# Patient Record
Sex: MALE | Race: WHITE
[De-identification: names, ages, dates, MRNs, and addresses within clinical notes are randomized per-mention and may not be internally consistent; named-entity substitution may affect disease eponyms.]

---

## 2022-08-30 ENCOUNTER — HOSPITAL ENCOUNTER (OUTPATIENT)
Dept: HOSPITAL 75 - RAD FS | Age: 62
End: 2022-08-30
Attending: NURSE PRACTITIONER
Payer: MEDICARE

## 2022-08-30 DIAGNOSIS — D86.9: Primary | ICD-10-CM

## 2022-08-30 DIAGNOSIS — Z99.81: ICD-10-CM

## 2022-08-30 DIAGNOSIS — K76.0: ICD-10-CM

## 2022-08-30 PROCEDURE — 71250 CT THORAX DX C-: CPT

## 2022-08-30 NOTE — DIAGNOSTIC IMAGING REPORT
CT CHEST WO



TECHNIQUE: Multiple contiguous axial images were obtained through

the chest without the use of intravenous contrast. All CT scans

use one or more of the following dose optimizing techniques:

automated exposure control, MA and/or KvP adjustment based on a

patient size and exam type, or iterative reconstruction. 



INDICATION: Sarcoidosis, hypoxia



COMPARISON: None available



FINDINGS:



Lungs and airway: No abnormality of the trachea. There is

bilateral peribronchial reticulations with traction

bronchiectasis that have an upper lobe predominance. In the right

upper lobe, there is a small region of honeycombing. No

suspicious pulmonary nodules.



Pleura: No pleural effusion or pneumothorax.



Heart and mediastinum: Thyroid is normal. No supraclavicular or

axillary lymphadenopathy. There are a few subcentimeter

mediastinal lymph nodes that are not enlarged. No hilar

lymphadenopathy is appreciated by noncontrast imaging. Heart is

normal in size without pericardial effusion. Severe coronary

artery calcification.



Upper abdomen: Diffuse hypoattenuation of the liver suggests

hepatic steatosis. There is suggestion of nodularity to the liver

surface raising the possibility of cirrhosis, as well.



Musculoskeletal: No worrisome focal osseous lesions.



IMPRESSION:

1. 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

predominance and is a pattern most compatible with fibrotic

sarcoidosis in this patient.

2. No lymphadenopathy.

3. Hepatic steatosis with potential underlying cirrhosis.



Dictated by: 



  Dictated on workstation # [id number]

## 2022-09-15 ENCOUNTER — HOSPITAL ENCOUNTER (OUTPATIENT)
Dept: HOSPITAL 75 - CARDFS | Age: 62
End: 2022-09-15
Attending: NURSE PRACTITIONER
Payer: MEDICARE

## 2022-09-15 DIAGNOSIS — F17.200: ICD-10-CM

## 2022-09-15 DIAGNOSIS — I34.8: ICD-10-CM

## 2022-09-15 DIAGNOSIS — D86.9: Primary | ICD-10-CM

## 2022-09-15 PROCEDURE — 93306 TTE W/DOPPLER COMPLETE: CPT

## 2022-10-06 ENCOUNTER — HOSPITAL ENCOUNTER (OUTPATIENT)
Dept: HOSPITAL 75 - ORTHO | Age: 62
End: 2022-10-06
Attending: ORTHOPAEDIC SURGERY
Payer: MEDICARE

## 2022-10-06 DIAGNOSIS — M16.12: Primary | ICD-10-CM

## 2022-10-10 ENCOUNTER — HOSPITAL ENCOUNTER (OUTPATIENT)
Dept: HOSPITAL 75 - RT | Age: 62
End: 2022-10-10
Attending: NURSE PRACTITIONER
Payer: MEDICARE

## 2022-10-10 ENCOUNTER — HOSPITAL ENCOUNTER (OUTPATIENT)
Dept: HOSPITAL 75 - RAD | Age: 62
End: 2022-10-10
Attending: ORTHOPAEDIC SURGERY
Payer: MEDICARE

## 2022-10-10 DIAGNOSIS — D86.9: ICD-10-CM

## 2022-10-10 DIAGNOSIS — Z99.81: ICD-10-CM

## 2022-10-10 DIAGNOSIS — M16.12: Primary | ICD-10-CM

## 2022-10-10 DIAGNOSIS — J84.10: Primary | ICD-10-CM

## 2022-10-10 DIAGNOSIS — R91.8: ICD-10-CM

## 2022-10-10 DIAGNOSIS — R93.89: ICD-10-CM

## 2022-10-10 PROCEDURE — 73502 X-RAY EXAM HIP UNI 2-3 VIEWS: CPT

## 2022-10-10 PROCEDURE — 94060 EVALUATION OF WHEEZING: CPT

## 2022-10-10 PROCEDURE — 94729 DIFFUSING CAPACITY: CPT

## 2022-10-10 PROCEDURE — 94726 PLETHYSMOGRAPHY LUNG VOLUMES: CPT

## 2022-10-10 PROCEDURE — 71046 X-RAY EXAM CHEST 2 VIEWS: CPT

## 2022-10-10 NOTE — DIAGNOSTIC IMAGING REPORT
INDICATION: 

Left hip pain and arthritis.



TECHNIQUE/COMPARISON: 

AP and frog-leg views of the left hip were obtained with

comparison made to the study of 10/04/2022 from [facility] in [facility]



FINDINGS:

Marked narrowing of the left hip joint space is noted, most

pronounced in the weightbearing portion, with subchondral

sclerosis and mild marginal spurring. No fracture is seen.



IMPRESSION: 

Advanced left hip osteoarthritis without acute osseous

abnormality.



Dictated by: 



  Dictated on workstation # [id number]

## 2022-10-10 NOTE — DIAGNOSTIC IMAGING REPORT
INDICATION: Presurgical evaluation



COMPARISON: 08/30/2022



TECHNIQUE: Two radiographs of the chest dated 10/10/2022.



FINDINGS: The cardiac silhouette is mildly enlarged. Mild central

pulmonary vascular congestion. Diffuse interstitial opacities are

noted throughout the lungs bilaterally, greatest within the right

upper and left lower lung. These opacities have slightly worsened

since the prior examination. No significant pleural effusion. No

pneumothorax. Scattered osseous degenerative changes without

acute osseous abnormality.



IMPRESSION: Significant diffuse interstitial opacities, favored

to relate to chronic interstitial lung changes and fibrosis.

However, some regions appear slightly worsened, particularly

within the right upper and lower lung, which may relate to

superimposed infiltrate versus worsening fibrosis.



Cardiac silhouette remains mildly enlarged.



Dictated by: 



  Dictated on workstation # [id number]

## 2022-10-11 ENCOUNTER — HOSPITAL ENCOUNTER (OUTPATIENT)
Dept: HOSPITAL 75 - LAB | Age: 62
End: 2022-10-11
Attending: ORTHOPAEDIC SURGERY
Payer: MEDICARE

## 2022-10-11 DIAGNOSIS — Z01.89: Primary | ICD-10-CM

## 2022-10-11 LAB
APTT BLD: 38 SEC (ref 24–35)
APTT PPP: YELLOW S
BACTERIA #/AREA URNS HPF: NEGATIVE /HPF
BASOPHILS # BLD AUTO: 0.1 10^3/UL (ref 0–0.1)
BASOPHILS NFR BLD AUTO: 1 % (ref 0–10)
BILIRUB UR QL STRIP: NEGATIVE
BUN/CREAT SERPL: 21
CALCIUM SERPL-MCNC: 9.3 MG/DL (ref 8.5–10.1)
CHLORIDE SERPL-SCNC: 106 MMOL/L (ref 98–107)
CO2 SERPL-SCNC: 20 MMOL/L (ref 21–32)
CREAT SERPL-MCNC: 0.68 MG/DL (ref 0.6–1.3)
EOSINOPHIL # BLD AUTO: 0.2 10^3/UL (ref 0–0.3)
EOSINOPHIL NFR BLD AUTO: 2 % (ref 0–10)
FIBRINOGEN PPP-MCNC: CLEAR MG/DL
GFR SERPLBLD BASED ON 1.73 SQ M-ARVRAT: 104 ML/MIN
GLUCOSE SERPL-MCNC: 100 MG/DL (ref 70–105)
GLUCOSE UR STRIP-MCNC: NEGATIVE MG/DL
HCT VFR BLD CALC: 54 % (ref 40–54)
HGB BLD-MCNC: 18.4 G/DL (ref 13.3–17.7)
INR PPP: 0.9 (ref 0.8–1.4)
KETONES UR QL STRIP: (no result)
LEUKOCYTE ESTERASE UR QL STRIP: NEGATIVE
LYMPHOCYTES # BLD AUTO: 1.2 10^3/UL (ref 1–4)
LYMPHOCYTES NFR BLD AUTO: 13 % (ref 12–44)
MANUAL DIFFERENTIAL PERFORMED BLD QL: NO
MCH RBC QN AUTO: 35 PG (ref 25–34)
MCHC RBC AUTO-ENTMCNC: 34 G/DL (ref 32–36)
MCV RBC AUTO: 102 FL (ref 80–99)
MONOCYTES # BLD AUTO: 0.8 10^3/UL (ref 0–1)
MONOCYTES NFR BLD AUTO: 9 % (ref 0–12)
NEUTROPHILS # BLD AUTO: 6.8 10^3/UL (ref 1.8–7.8)
NEUTROPHILS NFR BLD AUTO: 75 % (ref 42–75)
NITRITE UR QL STRIP: NEGATIVE
PH UR STRIP: 5.5 [PH] (ref 5–9)
PLATELET # BLD: 148 10^3/UL (ref 130–400)
PMV BLD AUTO: 10.4 FL (ref 9–12.2)
POTASSIUM SERPL-SCNC: 4.1 MMOL/L (ref 3.6–5)
PROT UR QL STRIP: (no result)
PROTHROMBIN TIME: 12.7 SEC (ref 12.2–14.7)
RBC #/AREA URNS HPF: (no result) /HPF
SODIUM SERPL-SCNC: 138 MMOL/L (ref 135–145)
SP GR UR STRIP: >=1.03 (ref 1.02–1.02)
SQUAMOUS #/AREA URNS HPF: (no result) /HPF
WBC # BLD AUTO: 9 10^3/UL (ref 4.3–11)
WBC #/AREA URNS HPF: (no result) /HPF

## 2022-10-11 PROCEDURE — 81000 URINALYSIS NONAUTO W/SCOPE: CPT

## 2022-10-11 PROCEDURE — 36415 COLL VENOUS BLD VENIPUNCTURE: CPT

## 2022-10-11 PROCEDURE — 85025 COMPLETE CBC W/AUTO DIFF WBC: CPT

## 2022-10-11 PROCEDURE — 85610 PROTHROMBIN TIME: CPT

## 2022-10-11 PROCEDURE — 80048 BASIC METABOLIC PNL TOTAL CA: CPT

## 2022-10-11 PROCEDURE — 85730 THROMBOPLASTIN TIME PARTIAL: CPT

## 2022-10-14 ENCOUNTER — HOSPITAL ENCOUNTER (OUTPATIENT)
Dept: HOSPITAL 75 - PREOP | Age: 62
End: 2022-10-14
Attending: ORTHOPAEDIC SURGERY
Payer: MEDICARE

## 2022-10-14 VITALS — BODY MASS INDEX: 30.59 KG/M2 | WEIGHT: 216.05 LBS | HEIGHT: 70.47 IN

## 2022-10-14 DIAGNOSIS — Z01.818: Primary | ICD-10-CM

## 2022-10-24 ENCOUNTER — HOSPITAL ENCOUNTER (OUTPATIENT)
Dept: HOSPITAL 75 - 4TH | Age: 62
LOS: 2 days | Discharge: HOME HEALTH | End: 2022-10-26
Attending: ORTHOPAEDIC SURGERY
Payer: MEDICARE

## 2022-10-24 VITALS — SYSTOLIC BLOOD PRESSURE: 157 MMHG | DIASTOLIC BLOOD PRESSURE: 100 MMHG

## 2022-10-24 VITALS — DIASTOLIC BLOOD PRESSURE: 69 MMHG | SYSTOLIC BLOOD PRESSURE: 155 MMHG

## 2022-10-24 VITALS — SYSTOLIC BLOOD PRESSURE: 157 MMHG | DIASTOLIC BLOOD PRESSURE: 88 MMHG

## 2022-10-24 VITALS — DIASTOLIC BLOOD PRESSURE: 82 MMHG | SYSTOLIC BLOOD PRESSURE: 131 MMHG

## 2022-10-24 VITALS — WEIGHT: 216.05 LBS | BODY MASS INDEX: 30.59 KG/M2 | HEIGHT: 70.47 IN

## 2022-10-24 VITALS — DIASTOLIC BLOOD PRESSURE: 81 MMHG | SYSTOLIC BLOOD PRESSURE: 142 MMHG

## 2022-10-24 VITALS — SYSTOLIC BLOOD PRESSURE: 131 MMHG | DIASTOLIC BLOOD PRESSURE: 76 MMHG

## 2022-10-24 VITALS — SYSTOLIC BLOOD PRESSURE: 143 MMHG | DIASTOLIC BLOOD PRESSURE: 92 MMHG

## 2022-10-24 VITALS — DIASTOLIC BLOOD PRESSURE: 97 MMHG | SYSTOLIC BLOOD PRESSURE: 148 MMHG

## 2022-10-24 VITALS — SYSTOLIC BLOOD PRESSURE: 137 MMHG | DIASTOLIC BLOOD PRESSURE: 81 MMHG

## 2022-10-24 DIAGNOSIS — E66.9: ICD-10-CM

## 2022-10-24 DIAGNOSIS — Z89.511: ICD-10-CM

## 2022-10-24 DIAGNOSIS — M16.12: Primary | ICD-10-CM

## 2022-10-24 DIAGNOSIS — F17.290: ICD-10-CM

## 2022-10-24 PROCEDURE — 94664 DEMO&/EVAL PT USE INHALER: CPT

## 2022-10-24 PROCEDURE — 97110 THERAPEUTIC EXERCISES: CPT

## 2022-10-24 PROCEDURE — 85014 HEMATOCRIT: CPT

## 2022-10-24 PROCEDURE — 36415 COLL VENOUS BLD VENIPUNCTURE: CPT

## 2022-10-24 PROCEDURE — 87081 CULTURE SCREEN ONLY: CPT

## 2022-10-24 PROCEDURE — 72170 X-RAY EXAM OF PELVIS: CPT

## 2022-10-24 PROCEDURE — 97162 PT EVAL MOD COMPLEX 30 MIN: CPT

## 2022-10-24 PROCEDURE — 97165 OT EVAL LOW COMPLEX 30 MIN: CPT

## 2022-10-24 PROCEDURE — 85018 HEMOGLOBIN: CPT

## 2022-10-24 PROCEDURE — 93005 ELECTROCARDIOGRAM TRACING: CPT

## 2022-10-24 PROCEDURE — 27130 TOTAL HIP ARTHROPLASTY: CPT

## 2022-10-24 PROCEDURE — 94760 N-INVAS EAR/PLS OXIMETRY 1: CPT

## 2022-10-24 PROCEDURE — 97530 THERAPEUTIC ACTIVITIES: CPT

## 2022-10-24 PROCEDURE — 94640 AIRWAY INHALATION TREATMENT: CPT

## 2022-10-24 RX ADMIN — ASPIRIN SCH MG: 81 TABLET ORAL at 17:28

## 2022-10-24 RX ADMIN — CELECOXIB SCH MG: 100 CAPSULE ORAL at 20:13

## 2022-10-24 RX ADMIN — SODIUM CHLORIDE SCH MLS/HR: 900 INJECTION, SOLUTION INTRAVENOUS at 20:15

## 2022-10-24 RX ADMIN — SODIUM CHLORIDE SCH MLS/HR: 900 INJECTION, SOLUTION INTRAVENOUS at 16:42

## 2022-10-24 RX ADMIN — SODIUM CHLORIDE SCH MLS/HR: 900 INJECTION, SOLUTION INTRAVENOUS at 23:02

## 2022-10-24 RX ADMIN — SODIUM CHLORIDE, SODIUM LACTATE, POTASSIUM CHLORIDE, AND CALCIUM CHLORIDE PRN MLS/HR: 600; 310; 30; 20 INJECTION, SOLUTION INTRAVENOUS at 12:51

## 2022-10-24 RX ADMIN — SODIUM CHLORIDE, SODIUM LACTATE, POTASSIUM CHLORIDE, AND CALCIUM CHLORIDE PRN MLS/HR: 600; 310; 30; 20 INJECTION, SOLUTION INTRAVENOUS at 11:03

## 2022-10-24 RX ADMIN — DOCUSATE SODIUM SCH MG: 100 CAPSULE ORAL at 22:41

## 2022-10-24 NOTE — PROGRESS NOTE-PRE OPERATIVE
Pre-Operative Progress Note


Date of Available H&P:  Oct 6, 2022


Date H&P Reviewed:  Oct 24, 2022


Time H&P Reviewed:  11:50


History & Physical:  H&P Reviewed, Patient Examed, No changes noted


Pre-Operative Diagnosis:  Left Hip Osteoarthritis











TIM GREENWOOD MD              Oct 24, 2022 14:23

## 2022-10-24 NOTE — OPERATIVE REPORT - ORTHO
Operative Report


Surgeon (s)/Assistant (s)


Surgeon


TIM GREENWOOD MD


Assistant


n/a





Pre-Operative Diagnosis


LEFT HIP OSTEOARTHRITIS





Post-Operative Diagnosis


same





Operative Report


Date of Procedure:  Oct 24, 2022


Name of Procedure Performed:  


Left Total Hip Arthroplasty


Description & Findings


After obtaining informed consent and marking the patient in the preoperative 

holding area, the patient did receive antibiotics and was taken to the operating

room.  General anesthesia was induced and patient was positioned in the lateral 

decubitus position with the left side up.  Left lower extremity was prepped and 

draped in the usual sterile fashion.  Surgical timeout was taken.  

Posterolateral approach was utilized.  Capsule and external rotators were taken 

down in one layer.  Hip was dislocated without difficulty.  Femoral neck ost

eotomy was performed and femoral head was removed.  Acetabulum was exposed.  

Labrum and soft tissue was removed from the acetabulum.  Sequential reaming was 

began beginning with a 50 mm reamer and reaming to a 56 mm.  56 mm trial was 

placed and had good fit.  Trial was removed and the acetabulum was lavaged with 

normal saline; a 56 mm cup was impacted into place and had excellent press fit. 

A trial line was put into place.





Attention was turned to the femoral side, a cookie cutter osteotome was used to 

removed bone near the greater trochanter.  Canal finder was inserted followed by

the lateralizing reamer.  Sequential broaching was began with a 0 and was 

broached to a 6.  Trial 132 degree neck and neutral 36 mm head were put into 

place.  The hip was stable in position of sleep, and stable in flexion and 

internal rotation.  This was accepted.  Hip was dislocated.  Trial components we

re removed and the canal was irrigated.  Acetabulum was exposed and trial liner 

was removed.  Polyethylene liner was impacted into place and locking mechanism 

was checked.  A size 6 Accolade II was impacted into place and set at the same 

level as the broach.  A +2.5 36 mm trial was found to improve stability.  A +2.5

36 mm ceramic head was impacted onto the estrada taper of the stem.  Hip was once 

again located and found to have stability in position of sleep as well as 

flexion and internal rotation.





Dilute betadine soak was performed.  Hip was irrigated.  Tranexamic acid was 

applied for hemostasis.  The fascial layer was closed with #2 StrataFix.  The 

subcutaneous layer was closed with 2-0 Vicryl.  Skin was closed with staples.  

Wound was dressed with xeroform, 4x4s, ABD, and tape.  Patient was placed in 

abduction pillow and transferred to his hospital bed without difficulty and was 

stable to the recovery room.


Anesthesia Type


General


Estimated Blood Loss


400 mL


Specimen(s) collected/removed


None











TIM GREENWOOD MD              Oct 24, 2022 14:22

## 2022-10-24 NOTE — PHYSICAL THERAPY PROGRESS NOTE
Therapy Progress Note


Patient not available to participate with PT at this time.  PT to evaluate 

patient in GUERITA Kennedy PT              Oct 24, 2022 15:23

## 2022-10-24 NOTE — DIAGNOSTIC IMAGING REPORT
INDICATION:  

Post operative check. 



EXAMINATION: 

Pelvis on 10/24/2022, 2 views.



FINDINGS:

There is a total hip arthroplasty on the left which appears well

aligned. Overlying skin staples are noted with subcutaneous air,

consistent with recent surgery. There is no acute osseous

abnormality. A Beauchamp catheter is incidentally noted. 



IMPRESSION:

Uncomplicated expected post operative findings.



Dictated by: 



  Dictated on workstation # [id number]

## 2022-10-25 VITALS — SYSTOLIC BLOOD PRESSURE: 127 MMHG | DIASTOLIC BLOOD PRESSURE: 76 MMHG

## 2022-10-25 VITALS — SYSTOLIC BLOOD PRESSURE: 134 MMHG | DIASTOLIC BLOOD PRESSURE: 69 MMHG

## 2022-10-25 VITALS — SYSTOLIC BLOOD PRESSURE: 120 MMHG | DIASTOLIC BLOOD PRESSURE: 59 MMHG

## 2022-10-25 VITALS — DIASTOLIC BLOOD PRESSURE: 65 MMHG | SYSTOLIC BLOOD PRESSURE: 128 MMHG

## 2022-10-25 VITALS — SYSTOLIC BLOOD PRESSURE: 132 MMHG | DIASTOLIC BLOOD PRESSURE: 72 MMHG

## 2022-10-25 VITALS — SYSTOLIC BLOOD PRESSURE: 117 MMHG | DIASTOLIC BLOOD PRESSURE: 79 MMHG

## 2022-10-25 LAB
HCT VFR BLD CALC: 49 % (ref 40–54)
HGB BLD-MCNC: 16.4 G/DL (ref 13.3–17.7)

## 2022-10-25 RX ADMIN — FLUTICASONE PROPIONATE AND SALMETEROL SCH EACH: 232; 14 POWDER, METERED RESPIRATORY (INHALATION) at 21:42

## 2022-10-25 RX ADMIN — Medication SCH EA: at 06:10

## 2022-10-25 RX ADMIN — CELECOXIB SCH MG: 100 CAPSULE ORAL at 19:10

## 2022-10-25 RX ADMIN — ROSUVASTATIN CALCIUM SCH MG: 10 TABLET, FILM COATED ORAL at 08:55

## 2022-10-25 RX ADMIN — UMECLIDINIUM SCH INH: 62.5 AEROSOL, POWDER ORAL at 07:13

## 2022-10-25 RX ADMIN — SODIUM CHLORIDE SCH MLS/HR: 900 INJECTION, SOLUTION INTRAVENOUS at 06:11

## 2022-10-25 RX ADMIN — ASPIRIN SCH MG: 81 TABLET ORAL at 18:23

## 2022-10-25 RX ADMIN — DOCUSATE SODIUM SCH MG: 100 CAPSULE ORAL at 19:14

## 2022-10-25 RX ADMIN — ASPIRIN SCH MG: 81 TABLET ORAL at 08:55

## 2022-10-25 RX ADMIN — GABAPENTIN SCH MG: 300 CAPSULE ORAL at 11:44

## 2022-10-25 RX ADMIN — HYDROMORPHONE HYDROCHLORIDE PRN MG: 2 INJECTION, SOLUTION INTRAMUSCULAR; INTRAVENOUS; SUBCUTANEOUS at 09:03

## 2022-10-25 RX ADMIN — DOCUSATE SODIUM SCH MG: 100 CAPSULE ORAL at 08:55

## 2022-10-25 RX ADMIN — CELECOXIB SCH MG: 100 CAPSULE ORAL at 08:55

## 2022-10-25 RX ADMIN — GABAPENTIN SCH MG: 300 CAPSULE ORAL at 11:52

## 2022-10-25 RX ADMIN — SODIUM CHLORIDE SCH MLS/HR: 900 INJECTION, SOLUTION INTRAVENOUS at 04:08

## 2022-10-25 RX ADMIN — FLUTICASONE PROPIONATE AND SALMETEROL SCH EACH: 232; 14 POWDER, METERED RESPIRATORY (INHALATION) at 07:13

## 2022-10-25 RX ADMIN — HYDROMORPHONE HYDROCHLORIDE PRN MG: 2 INJECTION, SOLUTION INTRAMUSCULAR; INTRAVENOUS; SUBCUTANEOUS at 19:22

## 2022-10-25 NOTE — OCCUPATIONAL THERAPY EVAL
OT Evaluation-General/PLF


Medical Diagnosis


Admission Date


Oct 24, 2022 at 10:11


Medical Diagnosis:  L CIRILO


Onset Date:  Oct 24, 2022





Therapy Diagnosis


Therapy Diagnosis:  decreased ADL status





Precautions


Precautions/Isolations:  Standard Precautions


Comments


L hip precautions





Weight Bear Status


Weight Bearing Restriction:  Weight Bearing/Tolerated





Referral


Physician:  Jeanmarie


Referral Reason:  Evaluation/Treatment





Medical History


Current History


s/p L CIRILO 10/25/22





Social History


Home:  Single Level


Current Living Status:  Spouse





ADL-Prior Level of Function


SCALE: Activities may be completed with or without assistive devices.





6-Indepedent-patient completes the activity by him/herself with no assistance 

from a helper.


5-Set-up or Clean-up Assistance-helper sets up or cleans up; patient completes 

activity. [place] assists only prior to or  


    following the activity.


4-Supervision or Touching Assistance-helper provides verbal cues and/or 

touching/steadying and/or contact guard assistance as patient completes activity

. Assistance may be provided   


    throughout the activity or intermittently.


3-Partial/Moderate Assistance-helper does LESS THAN HALF the effort. [place] 

lifts, holds or supports trunk or limbs, but provides less than half the effort.


2-Substantial/Maximal Assistance-helper does MORE THAN HALF the effort. [place] 

lifts or holds trunk or limbs and provides more than half the effort.


1-Wbyjzjpfr-gjuhuk does ALL the effort. Patient does none of the effort to 

complete the activity. Or, the assistance of 2 or more helpers is required for 

the patient to complete the  


    activity.


If activity was not attempted, code reason:


7-Patient Refused.


9-Not Applicable-not attempted and the patient did not perform the activity 

before the current illness, exacerbation or injury.


10-Not Attempted due to Environmental Limitations-(lack of equipment, weather 

restraints, etc.).


88-Not Attempted due to Medical Conditions or Safety Concerns.


ADL PLOF Comments


Pt reports IND with ADLs and functional mobility at PLOF


Self Care:  Independent


Functional Cognition:  Independent





OT Current Status


Subjective


Pt standing EOB upon OT arrival, agreeable to OT evaluation/tx,





Mental Status/Objective


Patient Orientation:  Person, Place, Time, Situation


Attachments:  Other-See Comments (RLE prosthesis)





Current


Upper Extremity ROM


WFL


Upper Extremity Strength


WFL





ADL-Treatment


Eating (QC):  6 (Per pt report)


Oral Hygiene (QC):  6 (Per pt report)


Upper Body Dressing (QC):  5 (Per clinical judgment.)


Lower Body Dressing (QC):  2 (Pt required assist with threading BLEs due to hip 

precautions.)


On/Off Footwear (QC):  1 (total assist due to hip precautions.)





Other Treatments


Pt standing EOB, agreeable to OT evaluation/tx. Pt provided information about 

PLOF and home set up and participated in UE screen. Pt able to recall hip 

precautions, OT provided education on hip precautions in regards to LE dressing.

Pt states his wife is able to assist him with LE dressing at home, they are both

retired. Pt has no concerns with his ability to complete ADLs upon returning 

home. He declines education on AE education, as he would prefer his wife to 

complete for him. Post tx, pt with PT, all needs met.





Education


OT Patient Education:  Correct positioning, Modified ADL techniques, Progress 

toward Goal/Update tx plan, Purpose of tx/functional activities, Reviewed 

precautions, Rehab process


Teaching Recipient:  Patient


Teaching Methods:  Discussion


Response to Teaching:  Verbalize Understanding





OT Long Term Goals


Long Term Goals


1=Demonstrate adherence to instructed precautions during ADL tasks.


2=Patient will verbalize/demonstrate understanding of assistive 

devices/modifications for ADL.


3=Patient will improve strength/tolerance for activity to enable patient to 

perform ADL's.





OT Education/Plan


Problem List/Assessment


Assessment:  No Skilled OT Needs ID'd


Pt able to recall hip precautions, and education provided on LE dressing with 

hip precautions. Pt declined further OT services at this time, states his wife 

can assist him at home and he is not interested in learning AE for LE dressing. 

Pt is at his PLOF with ADLs, except LE dressing due to hip precautions. Pt 

declined further OT services, d/c from OT at this time. If pt wants further 

information/education on AE, please send new OT orders.





Discharge Recommendations


Plan/Recommendations:  Discharge/Goals Met





Treatment Plan/Plan of Care


Patient would benefit from OT for education, treatment and training to promote 

independence in ADL's, mobility, safety and/or upper extremity function for 

ADL's.


Plan of Care:  ADL Retraining


Treatment Duration:  Oct 25, 2022


Frequency:  1 time per week (eval only)





Time/GCodes


Start Time:  10:25


Stop Time:  10:35


Total Time Billed (hr/min):  10


Billed Treatment Time


1, SALMA LYMAN OT          Oct 25, 2022 10:55

## 2022-10-25 NOTE — PHYSICAL THERAPY DAILY NOTE
PT Daily Note-Current


Subjective


Patient ambulating in hallway independently with FWW.





Pain





   Numeric Pain Scale:  5-Moderate Pain


   Location:  Left


   Location Body Site:  Hip


   Pain Description:  Acute


Section J - Health Conditions


1. Rarely or not at all


2. Occasionally


3. Frequently


4. Almost constantly


8. Unable to answer


Pain Effect on Sleep:  1


Pain Interference with Therapy:  1


Pain Interference w/Day-to-Day:  1





Transfers


SCALE: Activities may be completed with or without assistive devices.





6-Indepedent-patient completes the activity by him/herself with no assistance 

from a helper.


5-Set-up or Clean-up Assistance-helper sets up or cleans up; patient completes 

activity. [place] assists only prior to or  


    following the activity.


4-Supervision or Touching Assistance-helper provides verbal cues and/or 

touching/steadying and/or contact guard assistance as patient completes 

activity. Assistance may be provided   


    throughout the activity or intermittently.


3-Partial/Moderate Assistance-helper does LESS THAN HALF the effort. [place] 

lifts, holds or supports trunk or limbs, but provides less than half the effort.


2-Substantial/Maximal Assistance-helper does MORE THAN HALF the effort. [place] 

lifts or holds trunk or limbs and provides more than half the effort.


3-Fbvqcbbtc-mintni does ALL the effort. Patient does none of the effort to 

complete the activity. Or, the assistance of 2 or more helpers is required for 

the patient to complete the  


    activity.


If activity was not attempted, code reason:


7-Patient Refused.


9-Not Applicable-not attempted and the patient did not perform the activity 

before the current illness, exacerbation or injury.


10-Not Attempted due to Environmental Limitations-(lack of equipment, weather 

restraints, etc.).


88-Not Attempted due to Medical Conditions or Safety Concerns.


Sit to Stand (QC):  6





Weight Bearing


Right Lower Extremity:  Right


Full Weight Bearing


Left Lower Extremity:  Left


Weight Bearing/Tolerated





Assessment


Education with patient on importance of rest and exercise to allow proper 

healing of left hip.  Patient voices understanding.  Patient recites hip 

precautions.  Plan dismissal tomorrow after PT.





PT Short Term Goals


Short Term Goals


Time Frame:  Oct 27, 2022


Roll Left & Right:  6


Sit to lyin


Lying to sitting on side of be:  6


Sit to stand:  6


Chair/bed-to-chair transfer:  6


Toilet transfer:  6


Walk 10 feet:  6


Walk 50 feet with two turns:  6


Walk 150 feet:  6





PT Plan


Treatment/Plan


Treatment Plan:  Continue Plan of Care


Treatment Plan:  Education, Functional Activity Zhane, Functional Strength, 

Gait, Therapeutic Exercise


Treatment Duration:  Oct 27, 2022


Frequency:  BID until dismissal


Estimated Hrs Per Day:  .5 hour per day


Patient and/or Family Agrees t:  Yes





Time


Time In:  1305


Time Out:  1314


Total Billed Treatment Time:  8


Total Billed Treatment


1 visit


Educ 8 min











GUERITA WEN PT              Oct 25, 2022 14:12

## 2022-10-25 NOTE — PHYSICAL THERAPY EVALUATION
PT Evaluation-General


Medical Diagnosis


Admission Date


Oct 24, 2022 at 10:11


Medical Diagnosis:  L CIRILO


Onset Date:  Oct 24, 2022





Therapy Diagnosis


Therapy Diagnosis:  debility/weakness





Precautions


Precautions/Isolations:  Standard Precautions





Weight Bear Status


Right Lower Extremity:  Right


Full Weight Bearing


Left Lower Extremity:  Left


Weight Bearing/Tolerated





Referral


Physician:  Jeanmarie


Reason for Referral:  Evaluation/Treatment





Medical History


Additional Medical History


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


Current History


s/p elective left THR


Reviewed History:  Yes





Social History


Home:  Single Level


Current Living Status:  Spouse


Entry Into Home:  Level Entry





Prior


Prior Level of Function


SCALE: Activities may be completed with or without assistive devices.





6-Indepedent-patient completes the activity by him/herself with no assistance 

from a helper.


5-Set-up or Clean-up Assistance-helper sets up or cleans up; patient completes 

activity. [place] assists only prior to or  


    following the activity.


4-Supervision or Touching Assistance-helper provides verbal cues and/or 

touching/steadying and/or contact guard assistance as patient completes 

activity. Assistance may be provided   


    throughout the activity or intermittently.


3-Partial/Moderate Assistance-helper does LESS THAN HALF the effort. [place] 

lifts, holds or supports trunk or limbs, but provides less than half the effort.


2-Substantial/Maximal Assistance-helper does MORE THAN HALF the effort. [place] 

lifts or holds trunk or limbs and provides more than half the effort.


6-Tzamvkfld-cdyqlx does ALL the effort. Patient does none of the effort to 

complete the activity. Or, the assistance of 2 or more helpers is required for 

the patient to complete the  


    activity.


If activity was not attempted, code reason:


7-Patient Refused.


9-Not Applicable-not attempted and the patient did not perform the activity 

before the current illness, exacerbation or injury.


10-Not Attempted due to Environmental Limitations-(lack of equipment, weather 

restraints, etc.).


88-Not Attempted due to Medical Conditions or Safety Concerns.


Bed Mobility:  6


Transfers (B,C,W/C):  6


Gait:  6


Stairs:  6


Indoor Mobility (Ambulation):  Independent


Stairs:  Independent


Prior Devices Use:  Other-see list below


Prior Device Use:  cane PRN





PT Evaluation-Current


Subjective


Patient agrees to PT.





Pain





   Numeric Pain Scale:  3


   Location:  Left


   Location Body Site:  Hip


   Pain Description:  Acute





Objective


Patient Orientation:  Normal For Age





ROM/Strength


ROM Lower Extremities


left hip precautions/right LE WFL


Strength Lower Extremities


left LE 4-/5;right LE 4+/5 grossly





Integumentary/Posture


Bowel Incontinence:  No


Bladder Incontinence:  No


Posture


WFL





Neuromuscular


(Tone, Coordination, Reflexes)


grossly intact





Sensory


Vision:  Functional





Transfers


Sit to Lying (QC):  6


Lying to Sitting/Side of Bed(Q:  6


Sit to Stand (QC):  6


Chair/Bed-to-Chair Xfer(QC):  6


Toilet Transfer (QC):  6





Gait


Mode of Locomotion:  Walk


Anticipated Mode of Locomotion:  Walk


Walk 10 feet (QC):  6


Walk 50 ft with 2 Turns(QC):  6


Walk 150 ft (QC):  6


Distance:  >500'


Gait Assistive Device:  FWW


Comments/Gait Description


reciprocal pattern





Balance


Sitting Static:  Normal


Sitting Dynamic:  Normal


Standing Static:  Normal


 Standing Dynamic:  Normal





Assessment/Needs


Patient educated on hip precautions earlier on this date with ability to retain 

and repeat.  Patient is currently at independent LOF with all gross motor 

skills.  Patient will utilize spouse to perform dressing lower body due to hip 

precautions.  Patient instructed to ambulate PRN in hallway and to be up in room

ad brian.  RN notified.


Rehab Potential:  Good





PT Short Term Goals


Short Term Goals


Time Frame:  Oct 27, 2022


Roll Left & Right:  6


Sit to lyin


Lying to sitting on side of be:  6


Sit to stand:  6


Chair/bed-to-chair transfer:  6


Toilet transfer:  6


Walk 10 feet:  6


Walk 50 feet with two turns:  6


Walk 150 feet:  6





PT Plan


Treatment/Plan


Treatment Plan:  Continue Plan of Care


Treatment Plan:  Education, Functional Activity Zhane, Functional Strength, 

Gait, Therapeutic Exercise


Treatment Duration:  Oct 27, 2022


Frequency:  BID until dismissal


Estimated Hrs Per Day:  .5 hour per day


Patient and/or Family Agrees t:  Yes





Time


Time In:  1035


Time Out:  1102


Total Billed Treatment Time:  27


Total Billed Treatment


1 visit


EVModC 15 min


FA 12 min











GUERITA WEN PT              Oct 25, 2022 11:14

## 2022-10-25 NOTE — PROGRESS NOTE - ORTHO
Progress Note


Subjective


Date of Exam


10/25/22


Chief Complaint


POD #1 L CIRILO


HPI/Events since last exam


doing well, no specific concerns


Review of Systems


-


Allergies:  


Coded Allergies:  


     codeine (Unverified  Adverse Reaction, Unknown, Vomiting, 10/10/22)


     morphine (Unverified  Adverse Reaction, Unknown, Vomiting, 10/10/22)


Home Meds


Reported Medications


Tramadol HCl (Tramadol HCl ER) 100 Mg Tab.er.24h, 200 MG PO DAILY, TAB


   10/14/22


Rosuvastatin Calcium (Rosuvastatin Calcium) 10 Mg Tablet, 10 MG PO DAILY, TAB


   10/14/22


Naproxen (Naprosyn) 500 Mg Tablet, 500 MG PO UD, TAB


   10/14/22


Meloxicam (Meloxicam) 15 Mg Tablet, 15 MG PO UD, TAB


   10/14/22


Umeclidinium Bromide (Incruse Ellipta) 62.5 Mcg/Actuation Blst.w.dev, 62.5 MCG 

IH UD


   10/14/22


Gabapentin (Neurontin) 300 Mg Capsule, 300 MG PO UD, CAP


   10/14/22


Fluticasone/Vilanterol (Breo Ellipta 200-25 Mcg INH) 200 Mcg-25 Mcg/Dose 

Blst.w.dev, 1 EACH IH UD


   10/14/22





Objective


Exam


L Leg:  Dressing C/D/I, +DF of ankle, no s/s of DVT


Vital Signs





Vital Signs








  Date Time  Temp Pulse Resp B/P (MAP) Pulse Ox O2 Delivery O2 Flow Rate FiO2


 


10/25/22 08:13      Room Air  


 


10/25/22 08:13      Room Air  


 


10/25/22 04:10 36.8 79 18 117/79 (92) 98 Nasal Cannula 5.00 


 


10/25/22 00:07 36.0 87 20 127/76 (93) 97 Nasal Cannula 5.00 


 


10/24/22 21:00      Nasal Cannula 3.00 


 


10/24/22 20:24 36.4 84 20 131/76 (94) 94 Nasal Cannula 4.00 


 


10/24/22 16:43 35.9 97 20 143/92 (109) 90 Nasal Cannula 4.00 


 


10/24/22 15:55     94 Nasal Cannula 3.00 


 


10/24/22 15:20      Nasal Cannula 3.00 


 


10/24/22 15:10 36.4  20 157/88 (111) 94 Nasal Cannula 3.00 


 


10/24/22 15:05      OxyMask 3.00 


 


10/24/22 15:00   20 155/69 (97) 92 OxyMask 3.00 


 


10/24/22 14:50   20 142/81 (101) 92 OxyMask 3.00 


 


10/24/22 14:50      OxyMask 3.00 


 


10/24/22 14:40   20 148/97 (114) 94 OxyMask 4.00 


 


10/24/22 14:35      OxyMask 6.00 


 


10/24/22 14:30   18 157/100 (119) 95 OxyMask 6.00 


 


10/24/22 14:20 36.6  16 131/82 (98) 99 OxyMask 8.00 


 


10/24/22 14:20      OxyMask 8.00 


 


10/24/22 11:07      Room Air  


 


10/24/22 10:35 36.8 91 18 137/81 (99) 95 Room Air  














I & O 


 


 10/25/22





 07:00


 


Intake Total 4650 ml


 


Output Total 1450 ml


 


Balance 3200 ml








Lab Results


Laboratory Tests


10/25/22 05:54: 


Hemoglobin 16.4, Hematocrit 49


Imaging


Postop AP pelvis was reviewed and demonstrated left total hip arthroplasty with 

components in good position and no complication





Assessment and Plan


Assessment


Left Hip Primary OA s/p L CIRILO


Problem List


Left Hip Primary OA s/p L CIRILO


Plan


PT/OT


DVT Prophylaxis


Home with home health tomorrow





Final Diagonsis


Left Hip Primary OA s/p L CIRILO


Level of the visit:  Level 3 (global)











TIM GREENWOOD MD              Oct 25, 2022 08:32

## 2022-10-25 NOTE — ANESTHESIA-GENERAL POST-OP
General


Patient Condition


Mental Status/LOC:  Same as Preop


Cardiovascular:  Satisfactory


Nausea/Vomiting:  Absent


Respiratory:  Satisfactory


Pain:  Controlled


Complications:  Absent





Post Op Complications


Complications


None





Follow Up Care/Instructions


Patient Instructions


None needed.





Anesthesia/Patient Condition


Patient Condition


Patient is doing well, no complaints, stable vital signs, no apparent adverse 

anesthesia problems.   


No complications reported per nursing.











CHRISTIANO ROPER CRNA            Oct 25, 2022 09:59

## 2022-10-26 VITALS — SYSTOLIC BLOOD PRESSURE: 139 MMHG | DIASTOLIC BLOOD PRESSURE: 87 MMHG

## 2022-10-26 VITALS — SYSTOLIC BLOOD PRESSURE: 124 MMHG | DIASTOLIC BLOOD PRESSURE: 79 MMHG

## 2022-10-26 VITALS — SYSTOLIC BLOOD PRESSURE: 128 MMHG | DIASTOLIC BLOOD PRESSURE: 75 MMHG

## 2022-10-26 LAB
HCT VFR BLD CALC: 50 % (ref 40–54)
HGB BLD-MCNC: 16.9 G/DL (ref 13.3–17.7)

## 2022-10-26 RX ADMIN — DOCUSATE SODIUM SCH MG: 100 CAPSULE ORAL at 08:22

## 2022-10-26 RX ADMIN — ASPIRIN SCH MG: 81 TABLET ORAL at 08:18

## 2022-10-26 RX ADMIN — FLUTICASONE PROPIONATE AND SALMETEROL SCH EACH: 232; 14 POWDER, METERED RESPIRATORY (INHALATION) at 10:23

## 2022-10-26 RX ADMIN — GABAPENTIN SCH MG: 300 CAPSULE ORAL at 08:18

## 2022-10-26 RX ADMIN — DOCUSATE SODIUM SCH MG: 100 CAPSULE ORAL at 08:18

## 2022-10-26 RX ADMIN — Medication SCH EA: at 05:36

## 2022-10-26 RX ADMIN — UMECLIDINIUM SCH INH: 62.5 AEROSOL, POWDER ORAL at 10:23

## 2022-10-26 RX ADMIN — CELECOXIB SCH MG: 100 CAPSULE ORAL at 08:18

## 2022-10-26 RX ADMIN — ROSUVASTATIN CALCIUM SCH MG: 10 TABLET, FILM COATED ORAL at 08:18

## 2022-10-26 NOTE — DISCHARGE SUMMARY
Discharge Summary


Hospital Course


Hospital Course


Date of Admission: Oct 24, 2022 at 10:11 


Admission Diagnosis : Left Hip Primary Osteoarthritis 





Family Physician/Provider: Estela Turk  





Date of Discharge: 10/26/22 


Discharge Diagnosis: [ Left Hip Primary Osteoarthritis]








Hospital Course:


[On 10/24/22, patient underwent left total hip arthroplasty.  Postoperatively, 

admitted to the regular floor.  Mechanical DVT prophylaxis was began.  Began to 

mobilize that afternoon.  On POD #1, had some difficulty with pain control.  

Made progress with therapy.  Arrangements were made for home health.  On POD #2,

 pain control was improved and was ambulating over 100'.  Tolerating a regular 

diet.  Ready for discharge home with home health. ]














Labs and Pending Lab Test:


Laboratory Tests


10/26/22 05:45: 


Hemoglobin 16.9, Hematocrit 50





Microbiology


10/24/22 MRSA Screen - Final, Complete


           MRSA not isolated





Home Meds


Active


Aspirin EC (Aspirin) 81 Mg Tablet.dr 81 Mg PO BID WITH MEALS 35 Days


Reported


Aspirin EC (Aspirin) 81 Mg Tablet.dr 81 Mg PO DAILY


Ibuprofen 200 Mg Capsule 400 Mg PO DAILY PRN


Tramadol HCl 50 Mg Tablet 100 Mg PO DAILY


     TAKES 2 (50NG) TABS


Metaxalone 800 Mg Tablet 800 Mg PO DAILY PRN


Rosuvastatin Calcium 10 Mg Tablet 10 Mg PO DAILY


Meloxicam 15 Mg Tablet 15 Mg PO DAILY


Incruse Ellipta (Umeclidinium Bromide) 62.5 Mcg/Actuation Blst.w.dev 1 Puff IH 

DAILY


Neurontin (Gabapentin) 300 Mg Capsule 600 Mg PO DAILY


     TAKES 2 (300MG) CAPS


Breo Ellipta 200-25 Mcg INH (Fluticasone/Vilanterol) 200 Mcg-25 Mcg/Dose 

Blst.w.dev 1 Puff IH DAILY


Assessment/Pt Instructions


WBAT on left leg.  Walker for assist.  Posterior hip precautions.  Dry dressing 

daily; keep surgical site dry.





Discharge Physical Examination


Vital Signs





Vital Signs








  Date Time  Temp Pulse Resp B/P (MAP) Pulse Ox O2 Delivery O2 Flow Rate FiO2


 


10/26/22 10:26      Room Air  


 


10/26/22 10:24     90   


 


10/26/22 08:00 36.6 92 18 139/87 (104)    


 


10/25/22 09:00       4.00 








Extremity:  Other (Left Hip:  Dressing C/D/I, +DF of ankle, no s/s of DVT)


Allergies:  


Coded Allergies:  


     codeine (Unverified  Adverse Reaction, Unknown, Vomiting, 10/10/22)


     morphine (Unverified  Adverse Reaction, Unknown, Vomiting, 10/10/22)





Discharge Summary


Date of Admission


Oct 24, 2022 at 10:11


Date of Discharge














TIM GREENWOOD MD              Oct 26, 2022 10:32

## 2022-10-26 NOTE — D/C HH FACE TO FACE ORDER
D/C  Face to Face Orders


Instructions for Patient


[facility], [phone]


Patient Instructions/FollowUp:  


WBAT on left leg.  Walker for assist.  Posterior hip precautions.  Dry


dressing daily; keep surgical site dry.  F/U in 2 weeks with Dr. Murry


Physician to follow Patient:  Tavo Murry


Discharge Diet for Home:  No Restrictions





Patient Data-Allergies,Ht & Wt


Patient Allergies:  


Coded Allergies:  


     codeine (Unverified  Adverse Reaction, Unknown, Vomiting, 10/10/22)


     morphine (Unverified  Adverse Reaction, Unknown, Vomiting, 10/10/22)





Home Health Need/Face to Face


Date of Face to Face:  Oct 26, 2022


Clinical Findings:  Muscle weakness, Pain with ambulation, Unsteady gait


I have seen Pt face-to-face:  Yes


Discharged To:  Home


Diagnosis/Conditions:  


Left Hip Primary OA s/p CIRILO


Patient is Homebound due to:  Muscle weakness, Pain w/ambulation


Homebound Status


   Due to the above stated illness, injury or surgical procedure (medical 

condition or diagnosis) and associated clinical findings, the patient is 

homebound because of his/her inability to leave home except with aid of a 

supportive device and/or person AND leaving the home requires a considerable and

taxing effort or is medically contraindicated.


Pt req the following assistanc:  Walker





Home Health Nursing Orders


Home Health Services Order:  Physical Therapy-Evaluate & Treat





Home Health Infusion Therapy


Line Start Date:  Oct 24, 2022





Therapy Orders


Therapy Specific Orders:  Gait training, Increase strength/endurance, Restore 

ROM


Certify Stmt


I certify that this patient is under my care and that I, a nurse practitioner or

a physician; a assistant working with me, had a face to face encounter that -

meets the physician face to face encounter requirements with this patient as 

dated.











TAVO MURRY MD              Oct 26, 2022 10:33

## 2022-10-26 NOTE — PHYSICAL THERAPY DAILY NOTE
PT Daily Note-Current


Subjective


Pt. has min c/o pain at 4/10 left hip and several questions but feels confident 

about going home. Pt. hopes to receive home care PT then out pt PT to transition

to cane or no device





Pain





   Numeric Pain Scale:  4


   Location:  Left


   Location Body Site:  Hip


   Pain Description:  Ache


Section J - Health Conditions


1. Rarely or not at all


2. Occasionally


3. Frequently


4. Almost constantly


8. Unable to answer


Pain Effect on Sleep:  1


Pain Interference with Therapy:  1


Pain Interference w/Day-to-Day:  1





Mental Status


Patient Orientation:  Normal For Age


Attachments:  Other-See Comments (prosthesis right BKA)





Transfers


SCALE: Activities may be completed with or without assistive devices.





6-Indepedent-patient completes the activity by him/herself with no assistance 

from a helper.


5-Set-up or Clean-up Assistance-helper sets up or cleans up; patient completes 

activity. [place] assists only prior to or  


    following the activity.


4-Supervision or Touching Assistance-helper provides verbal cues and/or 

touching/steadying and/or contact guard assistance as patient completes ac

tivity. Assistance may be provided   


    throughout the activity or intermittently.


3-Partial/Moderate Assistance-helper does LESS THAN HALF the effort. [place] 

lifts, holds or supports trunk or limbs, but provides less than half the effort.


2-Substantial/Maximal Assistance-helper does MORE THAN HALF the effort. [place] 

lifts or holds trunk or limbs and provides more than half the effort.


8-Dilnuknkn-lpxwyj does ALL the effort. Patient does none of the effort to 

complete the activity. Or, the assistance of 2 or more helpers is required for 

the patient to complete the  


    activity.


If activity was not attempted, code reason:


7-Patient Refused.


9-Not Applicable-not attempted and the patient did not perform the activity 

before the current illness, exacerbation or injury.


10-Not Attempted due to Environmental Limitations-(lack of equipment, weather 

restraints, etc.).


88-Not Attempted due to Medical Conditions or Safety Concerns.


Roll Left & Right (QC):  6


Sit to Lying (QC):  4


Lying to Sitting/Side of Bed(Q:  6


Sit to Stand (QC):  6


Chair/Bed-to-Chair Xfer(QC):  6


pt. needed min assist to get in to flat bed entering with right side/leg in 

first, Pts wife to assist with this until he has mastered it, he is close.





Weight Bearing


Right Lower Extremity:  Right


Full Weight Bearing


Left Lower Extremity:  Left


Weight Bearing/Tolerated





Gait Training


Does the Patient Walk?:  Yes


Walk 10 feet (QC):  6


Walk 50 ft with 2 Turns(QC):  6


Walk 150 ft (QC):  6


Gait Assistive Device:  FWW





Stair Training


pt. has a ramp but instruction was given for steps





Exercises


Supine Ex:  Ankle pumps, Quad Set, Glut sets, Heel Slides, Short Arc Quads, 

Scooting, Straight leg raise (assisted), Hip abd/add (left assisted)


Supine Reps:  15


Seated Therapy Exercises:  Ankle pumps, Sit to stand, Long arc quads


Seated Reps:  15





Treatments


TRFs, Gait, precautions, therex, education





Assessment


Current Status:  Good Progress


meets goals





PT Short Term Goals


Short Term Goals


Time Frame:  Oct 27, 2022


Roll Left & Right:  6


Sit to lyin


Lying to sitting on side of be:  6


Sit to stand:  6


Chair/bed-to-chair transfer:  6


Toilet transfer:  6


Walk 10 feet:  6


Walk 50 feet with two turns:  6


Walk 150 feet:  6





PT Plan


Treatment/Plan


Treatment Plan:  Continue Plan of Care


Treatment Plan:  Education, Functional Activity Zh[place], Functional Strength, 

Gait, Therapeutic Exercise


Treatment Duration:  Oct 27, 2022


Frequency:  BID until dismissal


Estimated Hrs Per Day:  .5 hour per day


Patient and/or Family Agrees t:  Yes





Safety Risks/Education


Patient Education:  Gait Training, Transfer Techniques, Reviewed Precautions, 

Correct Positioning, Disease Process, Safety Issues


Teaching Recipient:  Patient


Teaching Methods:  Demonstration, Discussion


Response to Teaching:  Verbalize Understanding, Return Demonstration, 

Reinforcement Needed





Time


Time In:  910


Time Out:  944


Total Billed Treatment Time:  34


Total Billed Treatment


1,FA17m,ex17m











KAMILA RIVERA PTA             Oct 26, 2022 09:51

## 2022-11-17 ENCOUNTER — HOSPITAL ENCOUNTER (OUTPATIENT)
Dept: HOSPITAL 75 - ORTHO | Age: 62
End: 2022-11-17
Attending: ORTHOPAEDIC SURGERY
Payer: MEDICARE

## 2022-11-17 DIAGNOSIS — Z47.89: Primary | ICD-10-CM

## 2022-11-17 DIAGNOSIS — E78.00: ICD-10-CM

## 2022-12-29 ENCOUNTER — HOSPITAL ENCOUNTER (OUTPATIENT)
Dept: HOSPITAL 75 - ORTHO | Age: 62
End: 2022-12-29
Attending: ORTHOPAEDIC SURGERY
Payer: MEDICARE

## 2022-12-29 DIAGNOSIS — Z47.89: Primary | ICD-10-CM

## 2022-12-29 DIAGNOSIS — Z96.642: ICD-10-CM

## 2022-12-29 PROCEDURE — 73502 X-RAY EXAM HIP UNI 2-3 VIEWS: CPT

## 2022-12-29 NOTE — DIAGNOSTIC IMAGING REPORT
INDICATION: Follow-up orthopedic assessment, left total hip

arthroplasty.



COMPARISON: 10/10/2022 and 10/24/2022.



TECHNIQUE: Two radiographs of the left hip are obtained dated

12/29/2022.



FINDINGS: Left total hip arthroplasty is again identified

appearing stable from the prior examination without evidence of

hardware complication. No acute fracture or dislocation. No

destructive osseous process. Multiple metallic densities are seen

overlying the pelvis and left hip, felt to relate to overlying

artifact and clothing.



IMPRESSION: Left total hip arthroplasty without hardware

complication.



Dictated by: 



  Dictated on workstation # [id number]

## 2023-07-11 ENCOUNTER — HOSPITAL ENCOUNTER (OUTPATIENT)
Dept: HOSPITAL 75 - ORTHO | Age: 63
End: 2023-07-11
Attending: ORTHOPAEDIC SURGERY
Payer: MEDICARE

## 2023-07-11 DIAGNOSIS — M25.552: Primary | ICD-10-CM

## 2023-07-11 PROCEDURE — 73502 X-RAY EXAM HIP UNI 2-3 VIEWS: CPT

## 2023-07-11 PROCEDURE — 99213 OFFICE O/P EST LOW 20 MIN: CPT

## 2023-07-11 NOTE — DIAGNOSTIC IMAGING REPORT
Indication: Left hip pain.



Time of Exam: 1:48 PM



Correlation is made with prior radiograph from 12/29/2022.



Postop changes of a total hip arthroplasty are noted. Prosthetic

element appear to be in good position without fracture or

loosening. Left-sided rami are intact.



IMPRESSION: No acute abnormality is detected.



Dictated by: 



  Dictated on workstation # [id number]

## 2023-07-19 ENCOUNTER — HOSPITAL ENCOUNTER (OUTPATIENT)
Dept: HOSPITAL 75 - RAD | Age: 63
End: 2023-07-19
Attending: ORTHOPAEDIC SURGERY
Payer: MEDICARE

## 2023-07-19 DIAGNOSIS — Z98.890: ICD-10-CM

## 2023-07-19 DIAGNOSIS — M70.72: Primary | ICD-10-CM

## 2023-07-19 PROCEDURE — 76881 US COMPL JOINT R-T W/IMG: CPT

## 2023-07-19 NOTE — DIAGNOSTIC IMAGING REPORT
HISTORY: Pain in the left hip, recent surgery.



COMPARISON: Radiographs from 07/11/2023



TECHNIQUE: Ultrasound of the soft tissues of the left hip.



FINDINGS/

IMPRESSION:



No masses or fluid collections are seen about the left hip soft

tissues.



Dictated by: 



  Dictated on workstation # [id number]

## 2023-10-24 ENCOUNTER — HOSPITAL ENCOUNTER (OUTPATIENT)
Dept: HOSPITAL 75 - ORTHO | Age: 63
End: 2023-10-24
Attending: ORTHOPAEDIC SURGERY
Payer: MEDICARE

## 2023-10-24 DIAGNOSIS — M25.552: Primary | ICD-10-CM

## 2023-10-24 DIAGNOSIS — Z96.642: ICD-10-CM

## 2023-10-24 PROCEDURE — 99213 OFFICE O/P EST LOW 20 MIN: CPT

## 2023-10-24 PROCEDURE — 73502 X-RAY EXAM HIP UNI 2-3 VIEWS: CPT

## 2023-10-24 NOTE — DIAGNOSTIC IMAGING REPORT
EXAMINATION: Left hip unilateral 2 or 3 views (w/pelvis when

done)



HISTORY: PAIN IN LEFT HIP



COMPARISON: 07/11/2023



FINDINGS: 



There is left hip hemiarthroplasty. Components are in near

anatomic alignment. No fracture is seen. No dislocation.



IMPRESSION:



1. Expected postsurgical changes of a left hip arthroplasty in

near anatomic alignment.



Dictated by: 



  Dictated on workstation # [id number]